# Patient Record
Sex: MALE | Race: WHITE | NOT HISPANIC OR LATINO | Employment: STUDENT | URBAN - METROPOLITAN AREA
[De-identification: names, ages, dates, MRNs, and addresses within clinical notes are randomized per-mention and may not be internally consistent; named-entity substitution may affect disease eponyms.]

---

## 2020-01-08 ENCOUNTER — OFFICE VISIT (OUTPATIENT)
Dept: SPORTS MEDICINE | Facility: CLINIC | Age: 20
End: 2020-01-08

## 2020-01-08 ENCOUNTER — DOCUMENTATION (OUTPATIENT)
Dept: SPORTS MEDICINE | Facility: CLINIC | Age: 20
End: 2020-01-08

## 2020-01-08 VITALS
SYSTOLIC BLOOD PRESSURE: 124 MMHG | OXYGEN SATURATION: 97 % | WEIGHT: 233 LBS | HEIGHT: 78 IN | DIASTOLIC BLOOD PRESSURE: 50 MMHG | BODY MASS INDEX: 26.96 KG/M2 | TEMPERATURE: 97.9 F | HEART RATE: 70 BPM

## 2020-01-08 DIAGNOSIS — N45.1 EPIDIDYMITIS: ICD-10-CM

## 2020-01-08 DIAGNOSIS — N45.1 EPIDIDYMITIS: Primary | ICD-10-CM

## 2020-01-08 PROBLEM — IMO0001 IDDM (INSULIN DEPENDENT DIABETES MELLITUS): Status: ACTIVE | Noted: 2020-01-08

## 2020-01-08 PROCEDURE — 99203 OFFICE O/P NEW LOW 30 MIN: CPT | Performed by: FAMILY MEDICINE

## 2020-01-08 RX ORDER — DOXYCYCLINE HYCLATE 100 MG
100 TABLET ORAL 2 TIMES DAILY
Qty: 28 TABLET | Refills: 0 | Status: SHIPPED | OUTPATIENT
Start: 2020-01-08

## 2020-01-08 RX ORDER — CEFTRIAXONE 500 MG/1
250 INJECTION, POWDER, FOR SOLUTION INTRAMUSCULAR; INTRAVENOUS ONCE
Qty: 250 MG | Refills: 0 | Status: SHIPPED | OUTPATIENT
Start: 2020-01-08 | End: 2020-01-08

## 2020-01-08 RX ORDER — DOXYCYCLINE HYCLATE 100 MG
100 TABLET ORAL 2 TIMES DAILY
Qty: 28 TABLET | Refills: 0 | Status: SHIPPED | OUTPATIENT
Start: 2020-01-08 | End: 2020-01-08 | Stop reason: SDUPTHER

## 2020-01-08 NOTE — PROGRESS NOTES
"Brian is a 19 y.o. year old male evaluation of a problem that is new to this examiner.    Chief Complaint   Patient presents with   • Abdominal Pain     LLQ        History of Present Illness   HPI   For the past 4 to 5 days patient has been having left lower quadrant/pelvic discomfort and over the past 24 to 48 hours has had more discomfort in the left testicle, has noticed some tender swelling in the testicle as well.  No fever or chills.  Patient is insulin-dependent diabetic and has not noted any changes in his blood sugar.  He has had one sexual encounter that was not unprotected.  Denies any discharge.  No melena no hematochezia.  No nausea or vomiting.  Is able to practice basketball without any discomfort.  Patient was seen in the training room 4 days ago with the above complaint and is having more discomfort right around the symphysis pubis and just mild discomfort over the inguinal ring but there was no evidence of hernia.  His symptoms now have isolated to the left testicle with swelling and discomfort.    I have reviewed the patient's medical, family, and social history in detail and updated the computerized patient record.    Review of Systems   Constitutional: Negative for fever.   Gastrointestinal:        Per HPI   Genitourinary:        Per HPI   Musculoskeletal:        Per HPI   Skin: Negative for wound.   Neurological: Negative for numbness.   All other systems reviewed and are negative.      /50 (BP Location: Left arm, Patient Position: Sitting, Cuff Size: Adult)   Pulse 70   Temp 97.9 °F (36.6 °C) (Oral)   Ht 205.7 cm (81\")   Wt 106 kg (233 lb)   SpO2 97%   BMI 24.97 kg/m²      Physical Exam   Constitutional: He is oriented to person, place, and time. He appears well-developed and well-nourished.   HENT:   Head: Normocephalic and atraumatic.   Eyes: Pupils are equal, round, and reactive to light. Conjunctivae and EOM are normal.   Cardiovascular:   No peripheral edema   Pulmonary/Chest: " Effort normal.   Genitourinary:   Genitourinary Comments: No tenderness to palpation of the lower quadrants.  No rebounding or guarding.  Positive bowel sounds.  No lymphadenopathy.  There is no penile discharge.  There is tenderness and swelling of the left epididymis.   Neurological: He is alert and oriented to person, place, and time.   Skin: Skin is warm and dry.   Psychiatric: He has a normal mood and affect. His behavior is normal.   Vitals reviewed.        Current Outpatient Medications:   •  cefTRIAXone (ROCEPHIN) 500 MG injection, Inject 250 mg into the appropriate muscle as directed by prescriber 1 (One) Time for 1 dose., Disp: 250 mg, Rfl: 0  •  doxycycline (VIBRAMYICN) 100 MG tablet, Take 1 tablet by mouth 2 (Two) Times a Day., Disp: 28 tablet, Rfl: 0    Current Facility-Administered Medications:   •  cefTRIAXone (ROCEPHIN) 250 mg in lidocaine PF 1% (XYLOCAINE) IM only syringe, 250 mg, Intramuscular, Q24H, Zhen Willams MD     Diagnoses and all orders for this visit:    Epididymitis  -     doxycycline (VIBRAMYICN) 100 MG tablet; Take 1 tablet by mouth 2 (Two) Times a Day.  -     cefTRIAXone (ROCEPHIN) 500 MG injection; Inject 250 mg into the appropriate muscle as directed by prescriber 1 (One) Time for 1 dose.  -     Chlamydia trachomatis, Neisseria gonorrhoeae, PCR - , Urine, Clean Catch  -     cefTRIAXone (ROCEPHIN) 250 mg in lidocaine PF 1% (XYLOCAINE) IM only syringe       Will treat as above for now and await GC and Chlamydia labs.      EMR Dragon/Transcription disclaimer:    Much of this encounter note is an electronic transcription/translation of spoken language to printed text.  The electronic translation of spoken language may permit erroneous, or at times, nonsensical words or phrases to be inadvertently transcribed.  Although I have reviewed the note for such errors some may still exist.

## 2020-01-11 LAB
C TRACH RRNA SPEC QL NAA+PROBE: POSITIVE
N GONORRHOEA RRNA SPEC QL NAA+PROBE: NEGATIVE